# Patient Record
Sex: FEMALE | Race: WHITE
[De-identification: names, ages, dates, MRNs, and addresses within clinical notes are randomized per-mention and may not be internally consistent; named-entity substitution may affect disease eponyms.]

---

## 2018-10-05 ENCOUNTER — HOSPITAL ENCOUNTER (OUTPATIENT)
Dept: HOSPITAL 75 - RAD | Age: 58
End: 2018-10-05
Attending: OBSTETRICS & GYNECOLOGY
Payer: COMMERCIAL

## 2018-10-05 DIAGNOSIS — Z12.31: Primary | ICD-10-CM

## 2018-10-05 DIAGNOSIS — Z80.3: ICD-10-CM

## 2018-10-05 PROCEDURE — 77067 SCR MAMMO BI INCL CAD: CPT

## 2018-10-05 NOTE — DIAGNOSTIC IMAGING REPORT
INDICATION: Routine screening.



COMPARISONS are made with prior mammograms from 09/28/2016 and

04/06/2015.



2-D and 3-D bilateral screening mammography was performed with

CAD.



Both breasts are heterogeneously dense, limiting the sensitivity

of mammography. The parenchymal pattern is stable. No mass or

malignant-appearing microcalcifications. The axillae are

unremarkable.



IMPRESSION: 



BI-RADS category 2. No mammographic features suspicious for

malignancy are identified.



ACR BI-RADS Category 2: Benign findings.

Result letter will be mailed to the patient.

Note: At least 10% of breast cancer is not imaged by mammography.















  Dictated on workstation # [id number]

## 2019-11-15 ENCOUNTER — HOSPITAL ENCOUNTER (OUTPATIENT)
Dept: HOSPITAL 75 - RAD | Age: 59
End: 2019-11-15
Attending: FAMILY MEDICINE
Payer: COMMERCIAL

## 2019-11-15 DIAGNOSIS — M48.061: ICD-10-CM

## 2019-11-15 DIAGNOSIS — M47.816: Primary | ICD-10-CM

## 2019-11-15 PROCEDURE — 72148 MRI LUMBAR SPINE W/O DYE: CPT

## 2019-11-15 NOTE — DIAGNOSTIC IMAGING REPORT
PROCEDURE: MRI lumbar spine.



TECHNIQUE: Multiplanar, multisequence MRI of the lumbar spine was

performed without contrast.



INDICATION:  Low back pain.



COMPARISON:  No prior studies are available for comparison.



FINDINGS:  Curvature and alignment of the lumbar spine is within

normal limits. Marrow signal intensity is unremarkable apart from

a benign hemangiolipoma within the T12 vertebral body. Vertebral

body heights are well-maintained. No acute compression fracture

is identified. There is loss of height and signal intensity to

the L5-S1 disc compatible with degenerative disc disease. Modic

changes in the adjacent endplates are noted. The conus is

unremarkable at approximately T12-L1 level.



T12-L1: The central canal and neural foramina are widely patent.



L1-L2: The central canal and neural foramina are widely patent.



L2-L3: Mild ligamentous thickening is present. Central canal and

neural foramina are widely patent.



L3-L4: There is some ligamentous thickening and facet changes but

central canal remains patent. There may be very mild neural

foraminal narrowing. Very slight retrolisthesis of L3 on L4 is

noted.



L4-L5: Broad-based disc/osteophyte complex flattens the ventral

thecal sac. Central canal remains patent. There is ligamentous

thickening and facet changes. There is mild-to-moderate left

neural foraminal narrowing. Right neuroforamen is patent.



L5-S1: Broad-based disc/osteophyte complex flattens the ventral

thecal sac. Central canal remains patent. There is narrowing of

bilateral lateral recesses. There is also moderate bilateral

neural foraminal stenosis greater on the right.



Paraspinous tissues are unremarkable.



IMPRESSION: Lower lumbar spondylosis and facet arthropathy with

mild multilevel lateral recess and neural foraminal stenosis

described level by level above. No central canal stenosis is

seen. No acute compression fracture is identified.



Dictated by: 



  Dictated on workstation # [id number]

## 2020-03-13 ENCOUNTER — HOSPITAL ENCOUNTER (OUTPATIENT)
Dept: HOSPITAL 75 - RAD | Age: 60
End: 2020-03-13
Attending: OBSTETRICS & GYNECOLOGY
Payer: COMMERCIAL

## 2020-03-13 DIAGNOSIS — Z12.31: Primary | ICD-10-CM

## 2020-03-13 PROCEDURE — 77067 SCR MAMMO BI INCL CAD: CPT

## 2020-03-13 NOTE — DIAGNOSTIC IMAGING REPORT
INDICATION: Routine screening.



Comparison is made with prior mammograms from 10/05/2018 and

09/28/2016.



2-D and 3-D bilateral screening mammography was performed.



The current study was also evaluated with a Computer Aided

Detection (CAD) system. 3-D tomosynthesis was also performed and

reviewed.



FINDINGS: Scattered fibroglandular densities are identified

bilaterally. There are densities noted in the outer portions of

both breasts at mid depth best seen on the CC views. Density on

the right is best seen on tomographic image 26 on the CC images.

No correlate on the MLO view is identified. Density on the left

is lateral left breast mid depth best seen on the CC view. No

definite correlate on the MLO view is identified. No suspicious

calcifications are seen. Axillae are unremarkable.



IMPRESSION: Bilateral breast densities. Additional views are

recommended for further evaluation.



ACR BI-RADS Category 0: Incomplete. (Needs additional imaging

evaluation).

Result letter will be mailed to the patient.

Note: At least 10% of breast cancer is not imaged by mammography.



Dictated by: 



  Dictated on workstation # [id number]

## 2020-03-30 ENCOUNTER — HOSPITAL ENCOUNTER (OUTPATIENT)
Dept: HOSPITAL 75 - RAD | Age: 60
End: 2020-03-30
Attending: OBSTETRICS & GYNECOLOGY
Payer: COMMERCIAL

## 2020-03-30 DIAGNOSIS — N60.01: Primary | ICD-10-CM

## 2020-03-30 PROCEDURE — 77066 DX MAMMO INCL CAD BI: CPT

## 2020-03-30 NOTE — DIAGNOSTIC IMAGING REPORT
INDICATION: Bilateral breast densities. Patient presents for

additional views.



Correlation is made with recent screening study from 03/13/2020.



2-D and 3-D bilateral diagnostic mammography was performed with

CAD. This included spot compression CC views bilaterally as well

as bilateral mediolateral views and bilateral rolled CC views.



There is a persistent density in the outer right breast. This

appears to be inferiorly located on the tomographic images. This

is approximately 9 cm from the nipple. Additional views of the

left breast fail to demonstrate a discrete mass. Area of density

in the outer left breast at posterior depth appears to represent

superimposed tissue. No suspicious calcifications are seen.



IMPRESSION: BI-RADS 0

1. Persistent density in the lower outer right breast posterior

depth. Further evaluation with ultrasound is recommended and will

be performed today.

2. Unremarkable additional views of the left breast.



ACR BI-RADS Category 0: Incomplete. (Needs additional imaging

evaluation).

Result letter will be mailed to the patient.

Note: At least 10% of breast cancer is not imaged by mammography.



Dictated by: 



  Dictated on workstation # [id number]

## 2020-04-01 NOTE — DIAGNOSTIC IMAGING REPORT
INDICATION: Right breast density.



Correlation is made with screening mammogram from 03/13/2020 and

diagnostic mammogram from 03/30/2020.



Sonographic interrogation outer right breast was performed. There

is a small ovoid cyst at 9:00 location, 7 cm from the nipple

measuring 6 mm x 3 mm x 4 mm. Even smaller adjacent cyst is

present. Likely accounts for the mammographic density. No solid

masses are seen.



IMPRESSION: BI-RADS 2



Simple cyst 9:00 location of the right breast, likely accounting

for the mammographic density. Patient may return to routine

annual screening mammography.



ACR BI-RADS Category 2: Benign findings.



Dictated by: 



  Dictated on workstation # [id number]

## 2020-11-10 ENCOUNTER — HOSPITAL ENCOUNTER (OUTPATIENT)
Dept: HOSPITAL 75 - PREOP | Age: 60
LOS: 89 days | End: 2021-02-07
Attending: INTERNAL MEDICINE
Payer: COMMERCIAL

## 2020-11-10 VITALS — WEIGHT: 240.52 LBS | BODY MASS INDEX: 40.07 KG/M2 | HEIGHT: 65 IN

## 2020-11-10 DIAGNOSIS — Z53.8: Primary | ICD-10-CM

## 2020-11-30 ENCOUNTER — HOSPITAL ENCOUNTER (OUTPATIENT)
Dept: HOSPITAL 75 - SLEEP | Age: 60
Discharge: HOME | End: 2020-11-30
Attending: NURSE PRACTITIONER
Payer: COMMERCIAL

## 2020-11-30 DIAGNOSIS — G47.33: Primary | ICD-10-CM

## 2021-08-30 ENCOUNTER — HOSPITAL ENCOUNTER (OUTPATIENT)
Dept: HOSPITAL 75 - RAD | Age: 61
End: 2021-08-30
Attending: OBSTETRICS & GYNECOLOGY
Payer: COMMERCIAL

## 2021-08-30 DIAGNOSIS — Z12.31: Primary | ICD-10-CM

## 2021-08-30 PROCEDURE — 77063 BREAST TOMOSYNTHESIS BI: CPT

## 2021-08-30 PROCEDURE — 77067 SCR MAMMO BI INCL CAD: CPT

## 2021-08-31 NOTE — DIAGNOSTIC IMAGING REPORT
Indication: Routine screening.



Comparison is made with prior mammogram from 3/13/2020 and

10/5/2018.



2-D and 3-D bilateral screening mammography was performed with

CAD.



Both breasts are heterogeneously dense, limiting the sensitivity

of mammography. There is a new density in the outer aspect of the

right breast at mid depth. No definite correlate on the MLO view

is seen. Left breast is unremarkable. No malignant-appearing

microcalcifications are seen. Axillae are unremarkable.



IMPRESSION: BI-RADS 0



Right breast density. Additional views recommended for further

evaluation.



ACR BI-RADS Category 0: Incomplete. (Needs additional imaging

evaluation).

Result letter will be mailed to the patient.

Note: At least 10% of breast cancer is not imaged by mammography.



Dictated by: 



  Dictated on workstation # [id number]

## 2021-09-10 ENCOUNTER — HOSPITAL ENCOUNTER (OUTPATIENT)
Dept: HOSPITAL 75 - RAD | Age: 61
End: 2021-09-10
Attending: OBSTETRICS & GYNECOLOGY
Payer: COMMERCIAL

## 2021-09-10 DIAGNOSIS — N60.01: Primary | ICD-10-CM

## 2021-09-10 PROCEDURE — 77065 DX MAMMO INCL CAD UNI: CPT

## 2021-09-10 PROCEDURE — 76642 ULTRASOUND BREAST LIMITED: CPT

## 2021-09-10 NOTE — DIAGNOSTIC IMAGING REPORT
INDICATION: Right breast density. Patient presents for additional

views.



Correlation is made with screening study from 08/30/2021.



Unilateral right 2-D and 3-D diagnostic mammography was

performed. This included spot compression CC, rolled CC as well

as conventional 90 degrees lateral views.



There is a persistent nodular density in the outer right breast

approximately 7 cm from the nipple. This is difficult to locate

on the medial lateral views. No other suspicious abnormality is

seen.



IMPRESSION: BI-RADS 0



Persistent density outer right breast 7 cm from the nipple.

Further evaluation outer right breast with ultrasound is

recommended and will be performed today.



ACR BI-RADS Category 0: Incomplete. (Needs additional imaging

evaluation).

Result letter will be mailed to the patient.

Note: At least 10% of breast cancer is not imaged by mammography.



Dictated by: 



  Dictated on workstation # [id number]

## 2021-09-10 NOTE — DIAGNOSTIC IMAGING REPORT
INDICATION: Right breast density.



COMPARISON: Correlation is made with the diagnostic mammogram of

earlier the same day and screening mammogram from 08/30/2021.



EXAMINATION: Sonographic interrogation of the outer right breast

was performed.



FINDINGS: At the 11 o'clock location, 5 cm from the nipple, is a

small cyst measuring 7 mm x 4 mm x 4 mm. This shows minimal

internal debris. This likely accounts for the mammographic

density. No internal vascularity is present. There is a tiny

cluster of cysts at the 9 o'clock location, 6 cm from the nipple.

No solid masses are detected.



IMPRESSION: Right breast cyst. Cyst at the 11 o'clock location

likely accounts for the mammographic density. The patient may

return to routine annual screening mammography. 



Dictated by: 



  Dictated on workstation # [id number]

## 2023-03-10 ENCOUNTER — HOSPITAL ENCOUNTER (OUTPATIENT)
Dept: HOSPITAL 75 - RAD | Age: 63
End: 2023-03-10
Attending: INTERNAL MEDICINE
Payer: COMMERCIAL

## 2023-03-10 DIAGNOSIS — Z12.31: Primary | ICD-10-CM

## 2023-03-10 PROCEDURE — 77067 SCR MAMMO BI INCL CAD: CPT

## 2023-03-10 PROCEDURE — 77063 BREAST TOMOSYNTHESIS BI: CPT

## 2023-03-13 NOTE — DIAGNOSTIC IMAGING REPORT
INDICATION: Routine screening.



Comparison is made with prior mammogram from 8/30/2021 and

3/13/2020.



2-D and 3-D bilateral screening mammography was performed with

CAD.



Both breasts are heterogeneously dense, limiting the sensitivity

of mammography. A benign-appearing nodule in the upper outer

right breast appears stable and was shown by ultrasound to

represent a cyst. No new mass is identified. No

malignant-appearing microcalcifications are seen. Axillae are

unremarkable.



IMPRESSION:  No mammographic features suspicious for malignancy

are identified.



ACR BI-RADS Category 2: Benign findings.

Result letter will be mailed to the patient.

Note: At least 10% of breast cancer is not imaged by mammography.



BI-RADS Category 2



Dictated by: 



  Dictated on workstation # [id number]

## 2023-03-14 NOTE — HISTORY AND PHYSICAL
COLONOSCOPY HISTORY AND PHYSICAL



HISTORY OF PRESENT ILLNESS:

The patient is a 62-year-old white female referred by Dr. Buckner for screening 

colonoscopy.  She thinks that she has had one other colonoscopy over 10 years 

ago.  Does not recall if there are any problems.  She denies bowel habit change,

bright red blood per rectum, melena or change in weight.  She is not aware of 

any family history for colon cancer or polyps.  There is a family history for 

breast cancer in her mother who succumbed to the disease at the age of 58.  She 

also has had one sister diagnosed with breast cancer.



PAST MEDICAL HISTORY:

Significant for hypertension and hyperlipidemia with no known history of 

coronary artery disease.



PAST SURGICAL HISTORY:

She has had 3 C-sections with 3 living children, tonsillectomy and adenoidectomy

as a child and total abdominal hysterectomy in 2009 with bilateral 

salpingo-oophorectomy for uterine adenomyosis and hyermenorrhagia with secondary

anemia.  No vaginal bleeding since.



SOCIAL HISTORY:

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 

occasional small volume alcohol consumption.



REVIEW OF SYSTEMS:

CONSTITUTIONAL:  Denies night sweats, chills, fever or change in weight.

GASTROINTESTINAL:  As noted in the HPI.

PULMONARY:  Denies cough, wheezing or shortness of breath.

CARDIOVASCULAR:  Denies chest pain, orthopnea, PND, or pedal edema.



PHYSICAL EXAMINATION:

GENERAL:  Reveals pleasant white female, appears to be in no acute distress.

VITAL SIGNS:  Weight 254 pounds, blood pressure 118/84.

HEENT:  Unremarkable.  Sclerae nonicteric.  No evidence for pallor.

CHEST:  Clear to auscultation.

CARDIOVASCULAR:  Reveals a regular rate and rhythm without murmur, S3, or S4.

ABDOMEN:  Soft, supple without mass, organomegaly, or tenderness.

EXTREMITIES:  Revealed no cyanosis, clubbing or edema.



ASSESSMENT AND PLAN:

The patient is being set up for screening colonoscopy deemed to be of average 

risk.  She qualifies for overweight status and has well controlled hypertension 

and reportedly well controlled, hyperlipidemia, on medical management.  No 

contraindications to proceeding with the planned procedure.  Prep instructions 

were given and questions were answered.



I thank you for the referral of this pleasant lady.





Job ID: [id number]

DocumentID: [id number]

Dictated Date: 03/08/2023 14:22:13

Transcription Date: 03/08/2023 14:52:00

Dictated By: HAYLEY RECIO MD

## 2023-03-15 ENCOUNTER — HOSPITAL ENCOUNTER (OUTPATIENT)
Dept: HOSPITAL 75 - PREOP | Age: 63
End: 2023-03-15
Attending: INTERNAL MEDICINE
Payer: COMMERCIAL

## 2023-03-15 VITALS — HEIGHT: 65 IN | BODY MASS INDEX: 42.31 KG/M2 | WEIGHT: 253.97 LBS

## 2023-03-15 DIAGNOSIS — Z01.818: Primary | ICD-10-CM

## 2023-03-24 ENCOUNTER — HOSPITAL ENCOUNTER (OUTPATIENT)
Dept: HOSPITAL 75 - ENDO | Age: 63
Discharge: HOME | End: 2023-03-24
Attending: INTERNAL MEDICINE
Payer: COMMERCIAL

## 2023-03-24 VITALS — DIASTOLIC BLOOD PRESSURE: 70 MMHG | SYSTOLIC BLOOD PRESSURE: 127 MMHG

## 2023-03-24 VITALS — SYSTOLIC BLOOD PRESSURE: 146 MMHG | DIASTOLIC BLOOD PRESSURE: 105 MMHG

## 2023-03-24 VITALS — DIASTOLIC BLOOD PRESSURE: 105 MMHG | SYSTOLIC BLOOD PRESSURE: 146 MMHG

## 2023-03-24 VITALS — BODY MASS INDEX: 42.31 KG/M2 | WEIGHT: 253.97 LBS | HEIGHT: 64.96 IN

## 2023-03-24 VITALS — SYSTOLIC BLOOD PRESSURE: 135 MMHG | DIASTOLIC BLOOD PRESSURE: 101 MMHG

## 2023-03-24 DIAGNOSIS — I10: ICD-10-CM

## 2023-03-24 DIAGNOSIS — E78.5: ICD-10-CM

## 2023-03-24 DIAGNOSIS — K57.30: ICD-10-CM

## 2023-03-24 DIAGNOSIS — Z79.899: ICD-10-CM

## 2023-03-24 DIAGNOSIS — Z12.11: Primary | ICD-10-CM

## 2023-03-24 DIAGNOSIS — K62.89: ICD-10-CM

## 2023-03-24 NOTE — ANESTHESIA-GENERAL POST-OP
MAC


Patient Condition


Mental Status/LOC:  Same as Preop


Cardiovascular:  Satisfactory


Nausea/Vomiting:  Absent


Respiratory:  Satisfactory


Pain:  Controlled


Complications:  Absent





Post Op Complications


Complications


None





Follow Up Care/Instructions


Patient Instructions


None needed.





Anesthesiology Discharge Order


Discharge Order


Patient is doing well, no complaints, stable vital signs, no apparent adverse 

anesthesia problems.   


No complications reported per nursing.











AG PADRON CRNA            Mar 24, 2023 11:11

## 2023-03-24 NOTE — PRE-OP NOTE & CONSCIOUS SEDAT
Pre-Operative Progress Note


Date H&P Reviewed:  Mar 24, 2023


Time H&P Reviewed:  09:11


History & Physical:  H&P Reviewed, Patient Examed, No changes noted


Pre-Op Diagnosis:  screening





Moderate Sedation PreProcedure


ASA Score


2














Airway 


 


Lungs 


 


Heart 


 


 ASA score


 


 ASA 1: a normal healthy patient


 


 ASA 2:  a patient with a mild systemic disease (mid diabetes, controlled 

hypertension, obesity 


 


 ASA 3:  a patient with a severe systemic disease that limits activity  (angina,

COPD, prior Myocardial infarction)


 


 ASA 4:  a patient with an incapacitating disease that is a constant threat to 

life (CHF, renal failure)


 


 ASA 5:  a moribund patient not expected to survive 24 hrs.  (ruptured aneurysm)


 


 ASA 6:  a declared brain-dead patient whose organs are being harvested.


 


 For emergent operations, add the letter E after the classification











Mallampati Classification


Grade 2





Sedation Plan


Analgesia, Amnesia, Plan communicated to team members, Discussed options with 

patient/fam, Discussed risks with patient/fam


The patient is an appropriate candidate to undergo the planned procedure, 

sedation, and anesthesia.





The patient immediately re-assessed prior to indication.











HAYLEY RECIO MD              Mar 24, 2023 09:11

## 2023-03-24 NOTE — PROGRESS NOTE-POST OPERATIVE
Post-Procedure Note


Physician (s)/Assistant (s)


Physician


HAYLEY RECIO MD





Pre-Procedure Diagnosis


Pre-Procedure Diagnosis:  screening





Post-Procedure Diagnosis


Post-operative diagnosis:  


Prior to undergoing colonoscopy digital rectal evaluation was performed.


Anal sphincter tone was normal and the perianal reflexes intact.  No


abnormalities noted on digital inspection of the anal canal or distal


rectal vault.  The colonoscope was then inserted into the rectum and under


direct visualization advanced to the cecum.  The cecum was identified by


identification of the ileocecal valve and cecal strap.  Photographic


documentation was obtained.  A careful inspection was made as the


colonoscope was withdrawn.  Quality the prep was good.





Findings:





There were no evidence for internal or external hemorrhoids.  1 prominent


anal papilla was noted with no evidence for polyps.  Mild to moderate


diverticular disease was noted scattered throughout the colon most


prominent in the sigmoid colon.  No evidence of diverticulitis was noted.


No evidence for neoplasia was identified.


A/P 1.  Mild to moderate diverticular disease scattered throughout the


colon was present without evidence for diverticulitis.  This was an


otherwise normal colonoscopy to the cecum with no evidence for neoplasia.


Would advocate consideration for repeat screening colonoscopy in 10 years.


I thank you for the furl this pleasant lady I sincerely, Hayley Recio MD.





CC: Dr. Tori Buckner DO.











HAYLEY RECIO MD              Mar 24, 2023 11:13